# Patient Record
Sex: FEMALE | Race: AMERICAN INDIAN OR ALASKA NATIVE
[De-identification: names, ages, dates, MRNs, and addresses within clinical notes are randomized per-mention and may not be internally consistent; named-entity substitution may affect disease eponyms.]

---

## 2017-03-02 NOTE — MAMMOGRAPHY REPORT
BILATERAL DIGITAL SCREENING MAMMOGRAM WITH CAD:03/02/17 00:00:00



CLINICAL: Baseline screening.



FINDINGS: The breasts are heterogeneously dense, which may obscure 

small masses.No mass, architectural distortion or suspicious 

calcifications.



IMPRESSION: No mammographic evidence of malignancy.



BI-RADS CATEGORY:  1 -- Negative



RECOMMENDATION: Routine mammographic screening based on ACS guidelines.





ACR BI-RADS MAMMOGRAPHIC CODES:

0 = Needs additional imaging evaluation; 1 = Negative; 2 = Benign; 3 = 

Probably benign; 4 = Suspicious; 5 = Malignant; 6 = Known biopsy-proven 

malignancy



COMMENT:

      1.   Dense breast tissue, i.e., adenosis, fibrocystic 

            changes, etc., may obscure an underlying neoplasm.

      2.   Approximately 10% of cancers are not detected with

            mammography.

      3.   A negative mammography report should not delay biopsy 

            if a clinically suspicious mass is present.

## 2017-12-31 ENCOUNTER — HOSPITAL ENCOUNTER (INPATIENT)
Dept: HOSPITAL 5 - TRG | Age: 37
LOS: 2 days | Discharge: HOME | End: 2018-01-02
Attending: OBSTETRICS & GYNECOLOGY | Admitting: OBSTETRICS & GYNECOLOGY
Payer: COMMERCIAL

## 2017-12-31 DIAGNOSIS — Z3A.39: ICD-10-CM

## 2017-12-31 LAB
HCT VFR BLD CALC: 24.1 % (ref 30.3–42.9)
HCT VFR BLD CALC: 30.4 % (ref 30.3–42.9)
HGB BLD-MCNC: 10.3 GM/DL (ref 10.1–14.3)
HGB BLD-MCNC: 8.2 GM/DL (ref 10.1–14.3)

## 2017-12-31 PROCEDURE — 85018 HEMOGLOBIN: CPT

## 2017-12-31 PROCEDURE — G0463 HOSPITAL OUTPT CLINIC VISIT: HCPCS

## 2017-12-31 PROCEDURE — 36415 COLL VENOUS BLD VENIPUNCTURE: CPT

## 2017-12-31 PROCEDURE — 85014 HEMATOCRIT: CPT

## 2017-12-31 PROCEDURE — 99211 OFF/OP EST MAY X REQ PHY/QHP: CPT

## 2017-12-31 RX ADMIN — IBUPROFEN SCH MG: 600 TABLET, FILM COATED ORAL at 09:16

## 2017-12-31 RX ADMIN — IBUPROFEN SCH MG: 600 TABLET, FILM COATED ORAL at 22:07

## 2017-12-31 RX ADMIN — IBUPROFEN SCH MG: 600 TABLET, FILM COATED ORAL at 15:13

## 2017-12-31 NOTE — PROCEDURE NOTE
OB Delivery Note





- Delivery


Date of Delivery: 17


Surgeon: TIFFANIE LEMOS


Estimated blood loss: 100cc





- Vaginal


Delivery presentation: vertex


Delivery position: OA


Intrapartum events: precipitous labor- <3hr


Delivery monitor: external FHT


Route of delivery: 


Delivery placenta: spontaneous


Delivery cord: 3 umbilical vessels


Episiotomy: none


Delivery laceration: none


Anesthesia: none


Delivery comments: 


Patient progressed to C/C/+2 and pushed to deliver a liveborn male with apgars 

of 8/9 and weight of 8lbs 6oz. After delivery of the head, the shoulders 

delivered without difficulty. The infant was bulb suctioned. The cord was 

clamped and cut. The infant was placed on the patient's abdomen. The placenta 

delivered spontaneously intact with a 3VC. No lacerations were noted. EBL 100ml








- Infant


  ** A


Apgar at 1 minute: 8


Apgar at 5 minutes: 9


Infant Gender: Male (weight 8lbs 6oz)

## 2017-12-31 NOTE — HISTORY AND PHYSICAL REPORT
History of Present Illness


Date of examination: 17


Date of admission: 


17 06:55





Chief complaint: 


contractions





History of present illness: 


36y/o  @ 39+1 weeks presents with regular uterine contractions and 

advanced cervical dilation of 9cm. Patient transferred her care at 26 weeks. 

She had spontaneous rupture of membranes at presentation. Prenatal course 

complicated low lying placenta and AMA. Patient is +GBS. 








Past History


Past Medical History: no pertinent history


Past Surgical History: no surgical history


Social history: 





- Obstetrical History


Expected Date of Delivery: 18


Actual Gestation: 39 Week(s) 1 Day(s) 


: 5


Para: 3


Hx # Term Pregnancies: 3


Number of  Pregnancies: 0


Spontaneous Abortions: 1


Induced : 0


Number of Living Children: 3





Medications and Allergies


 Allergies











Allergy/AdvReac Type Severity Reaction Status Date / Time


 


No Known Allergies Allergy   Unverified 17 06:59











 Home Medications











 Medication  Instructions  Recorded  Confirmed  Last Taken  Type


 


Pnv,Calcium 72/Iron/Folic Acid 1 tab PO DAILY 17 1 Day Ago 

History





[Pnv Prenatal Plus Multivit Tab]    ~17 














Review of Systems


Genitourinary: leakage of fluid, contractions





- Vital Signs


Vital signs: 


 Vital Signs











Pulse Pulse Ox


 


 91 H  77 L


 


 17 08:05  17 08:05








 











Temp Pulse Resp BP Pulse Ox


 


    140 H     112/58   28 L


 


    17 08:32     17 08:21  17 08:32














- Physical Exam


Breasts: Positive: deferred


Cardiovascular: Regular rate


Lungs: Positive: Clear to auscultation


Abdomen: Positive: normal appearance





Results


All other labs normal.








Assessment and Plan





- Patient Problems


(1) Precipitous delivery


Current Visit: Yes   Status: Acute   


Plan to address problem: 


admit to L&D


patient was unable to receive antibiotic coverage

## 2018-01-01 RX ADMIN — IBUPROFEN SCH MG: 600 TABLET, FILM COATED ORAL at 12:36

## 2018-01-01 RX ADMIN — IBUPROFEN SCH: 600 TABLET, FILM COATED ORAL at 03:45

## 2018-01-01 RX ADMIN — IBUPROFEN SCH: 600 TABLET, FILM COATED ORAL at 19:00

## 2018-01-01 RX ADMIN — IBUPROFEN SCH MG: 600 TABLET, FILM COATED ORAL at 18:04

## 2018-01-01 NOTE — DISCHARGE SUMMARY
Providers





- Providers


Date of Admission: 


17 06:55





Date of discharge: 18


Attending physician: 


SERAFIN LERMA





Primary care physician: 


SERAFIN LERMA








Hospitalization


Reason for admission: active labor


Delivery: 


Discharge diagnosis: IUP at term delivered


[place] baby: male


Hospital course: 


The patient was admitted to labor and delivery in active labor with advanced 

cervical dilatation of 9 cm.  The patient was GBS positive however she was 

unable to receive adequate antibiotic coverage therefore the patient and the 

infant were observed for 48 hours.  Postpartum course was uncomplicated.





Condition at discharge: Good


Disposition: DC-01 TO HOME OR SELFCARE





- Discharge Diagnoses


(1) Precipitous delivery


Status: Acute   





Plan





- Discharge Medications


Prescriptions: 


RX: Ferrous Sulfate [Feosol 325 MG tab] 325 mg PO BID #60 tablet


HYDROcodone/APAP 5-325 [[place] 5/325] 1 each PO Q6HR PRN #30 tablet


 PRN Reason: Pain


Ibuprofen [Motrin] 800 mg PO Q8HR PRN #60 tablet


 PRN Reason: Pain





- Provider Discharge Summary


Activity: no sex for 6 weeks, no heavy lifting 4 weeks, no strenuous exercise


Diet: routine


Instructions: routine


Additional instructions: 


[]  Smoking cessation referral if applicable(refer to patient education folder 

for contact #)


[]  Refer to [facility] Booklet








Call your doctor immediately for:


* Fever > 100.5


* Heavy vaginal bleeding ( >1 pad per hour)


* Severe persistent headache


* Shortness of breath


* Reddened, hot, painful area to leg or breast


* Follow-up 4 weeks postpartum





- Follow up plan

## 2018-01-01 NOTE — PROGRESS NOTE
Assessment and Plan





- Patient Problems


(1) Precipitous delivery


Current Visit: Yes   Status: Acute   


Plan to address problem: 


Patient doing well


Discharge home tomorrow








Subjective





- Subjective


Date of service: 18


Interval history: 


The patient is without any significant complaints.  She is not experiencing any 

dizziness or chest pain.  She reports the pain is well-controlled.


Patient reports: appetite normal, voiding normally, pain well controlled, no 

dizzy ambulation


[place]: doing well





Objective





- Vital Signs


Latest vital signs: 


 Vital Signs











  Temp Pulse Resp BP Pulse Ox


 


 18 08:21  98.5 F  81  22  96/56  100


 


 18 06:38  98.4 F   18  85/48 


 


 18 02:18  98.9 F  85  20  101/56  97


 


 17 21:19  99.0 F  82  20  98/59  100


 


 17 20:09   78   116/61 


 


 17 20:05  98.5 F   18  








 Intake and Output











 17





 22:59 06:59 14:59


 


Intake Total 240  120


 


Output Total  500 


 


Balance 240 -500 120


 


Intake:   


 


  Oral   120


 


  Intake, Free Water 240  


 


Output:   


 


  Urine  500 


 


    Void  500 


 


Other:   


 


  Total, Intake Amount   120


 


  Total, Output Amount  500 


 


  # Voids   


 


    Void 1  1














- Exam


Abdomen: Present: normal appearance


Uterus: Present: normal, firm





- Labs


Labs: 


 Abnormal lab results











  17 Range/Units





  20:35 


 


Hgb  8.2 L  (10.1-14.3)  gm/dl


 


Hct  24.1 L D  (30.3-42.9)  %

## 2018-01-02 VITALS — DIASTOLIC BLOOD PRESSURE: 72 MMHG | SYSTOLIC BLOOD PRESSURE: 112 MMHG

## 2018-01-02 RX ADMIN — IBUPROFEN SCH MG: 600 TABLET, FILM COATED ORAL at 04:17

## 2018-02-28 ENCOUNTER — HOSPITAL ENCOUNTER (OUTPATIENT)
Dept: HOSPITAL 5 - OR | Age: 38
Discharge: HOME | End: 2018-02-28
Attending: OBSTETRICS & GYNECOLOGY
Payer: COMMERCIAL

## 2018-02-28 VITALS — SYSTOLIC BLOOD PRESSURE: 128 MMHG | DIASTOLIC BLOOD PRESSURE: 66 MMHG

## 2018-02-28 DIAGNOSIS — Z30.2: Primary | ICD-10-CM

## 2018-02-28 DIAGNOSIS — Z98.890: ICD-10-CM

## 2018-02-28 DIAGNOSIS — N83.201: ICD-10-CM

## 2018-02-28 DIAGNOSIS — D64.9: ICD-10-CM

## 2018-02-28 LAB
HCT VFR BLD CALC: 31.2 % (ref 30.3–42.9)
HGB BLD-MCNC: 10.4 GM/DL (ref 10.1–14.3)

## 2018-02-28 PROCEDURE — 85018 HEMOGLOBIN: CPT

## 2018-02-28 PROCEDURE — 58661 LAPAROSCOPY REMOVE ADNEXA: CPT

## 2018-02-28 PROCEDURE — 85014 HEMATOCRIT: CPT

## 2018-02-28 PROCEDURE — 36415 COLL VENOUS BLD VENIPUNCTURE: CPT

## 2018-02-28 PROCEDURE — 81025 URINE PREGNANCY TEST: CPT

## 2018-02-28 PROCEDURE — 88302 TISSUE EXAM BY PATHOLOGIST: CPT

## 2018-02-28 NOTE — OPERATIVE REPORT
Operative Report


Operative Report: 





Preoperative diagnosis: Undesired fertility


Postoperative diagnosis: Same


Procedure: Bilateral laparoscopic salpingectomy


Surgeon: Denise Lozano


Anesthesia: General


EBL: Minimal 


IV fluids: 1100 mL


Urine output: 300 mL


Findings:[Normal uterus tubes and ovaries with small right ovarian cyst]


Specimens: Portion of right and left fallopian tube


Complications: None





The patient was properly identified as herself.  She was then taken to the OR 

with IV running and in place.  She was given general anesthesia without 

difficulty.  She was placed in a dorsal lithotomy position.  She was then 

prepped and draped in normal sterile fashion.  Attention was turned to the 

patient's vagina.  Her bladder was drained of clear urine with a red rubber 

catheter.  The speculum was then placed the patient's vagina.  The cervix was 

visualized and grasped with tenaculum.  The acorn cannula was then inserted.  

The surgeon's gloves were changed and attention turned to the patient's 

abdomen.  A small incision was made in the patient's umbilicus incision a 5 mm 

trocar was placed.  The laparoscope confirmed intra-abdominal placement.  The 

abdomen was insufflated with CO2 gas to approximately 25 mmHg.  Both fallopian 

tubes were identified. With direct visualization a second trocar was placed 

through an incision in the left lower quadrant.  Both tubes were found and 

followed out to the fimbriated ends.  Each tube was cauterized at the portion 

nearest the cornua, then cauterized across the broad ligament until the tube 

was completely detached.  Attention was then turned to the patient's right 

ovary.  Using the scissors the cyst was punctured and clear fluid was allowed 

to drain from it.


There was excellent hemostasis at the end of this portion of the procedure.  

Each tube was handed off for pathology.  At this point the abdomen was 

deflated.  All instruments were then removed from the abdomen. The incisions 

were then closed with 4-0 Monocryl.  The incisions were also injected with 

quarter percent Marcaine.  The patient tolerated the procedure well she was 

then awakened and taken recovery in stable condition.  Sponge needle and 

instrument counts were correct 2.

## 2018-02-28 NOTE — ANESTHESIA CONSULTATION
Anesthesia Consult and Med Hx





- Airway


Anesthetic Teeth Evaluation: Good


ROM Head & Neck: Adequate


Mental/Hyoid Distance: Adequate


Mallampati Class: Class II


Intubation Access Assessment: Good





- Pulmonary Exam


CTA: Yes





- Cardiac Exam


Cardiac Exam: RRR





- Pre-Operative Health Status


ASA Pre-Surgery Classification: ASA1


Proposed Anesthetic Plan: General





- Pulmonary


Hx Asthma: No


COPD: No


Hx Pneumonia: No





- Cardiovascular System


Hx Hypertension: No





- Central Nervous System


Hx Seizures: No


Hx Psychiatric Problems: No





- Endocrine


Hx Renal Disease: No


Hx End Stage Renal Disease: No


Hx Hypothyroidism: No


Hx Hyperthyroidism: No





- Hematic


Hx Anemia: Yes


Hx Sickle Cell Disease: No





- Other Systems


Hx Alcohol Use: Yes (occas)


Hx Cancer: No

## 2018-02-28 NOTE — SHORT STAY SUMMARY
Short Stay Documentation


Date of service: 18


Narrative H&P: 


Patient is a 38 year old  who presents for tubal ligation 6 weeks post 

delivery of a male infant.





- History


Principal diagnosis: Undesired fertility


H&P: obtained from office


Past Medical History: No medical history


Past Surgical History: No surgical history


Social history: single





- Allergies and Medications


Current Medications: 


 Allergies





No Known Allergies Allergy (Unverified 18 11:14)


 





 Home Medications











 Medication  Instructions  Recorded  Confirmed  Last Taken  Type


 


Norethindrone [Norlyroc] 0.35 mg PO DAILY 18 Unknown History








Active Medications





Hydromorphone HCl (Dilaudid)  0.5 mg IV Q10MIN PRN


   PRN Reason: Pain , Severe (7-10)


Lactated Ringer's (Lactated Ringers)  1,000 mls @ 42 mls/hr IV AS DIRECT DINA


Cefazolin Sodium (Ancef/Sterile Water 2 Gm/20 Ml)  2 gm in 20 mls @ 80 mls/hr 

IV PREOP NR; Protocol


Meperidine HCl (Demerol)  25 mg IV ONCE PRN


   PRN Reason: Shivering


Ondansetron HCl (Zofran)  4 mg IV ONCE PRN


   PRN Reason: Nausea And Vomiting


Oxycodone/Acetaminophen (Percocet 5/325)  1 tab PO ONCE PRN


   PRN Reason: Pain, Moderate (4-6)











- Physical exam


General appearance: no acute distress


Integumentary: no rash, no growths


Lungs: Clear to auscultation, Normal air movement


Breasts: deferred


Heart: Regular rate, Normal S1, Normal S2


Gastrointestinal: normal, normoactive bowel sounds


Female Genitourinary: deferred


Rectal Exam: deferred


Extremities: No edema





- Brief post op/procedure progress note


Date of procedure: 18


Pre-op diagnosis: Undesired fertility


Post-op diagnosis: same


Procedure: 





Bilateral partial salpingectomy


Anesthesia: GETA


Findings: 





Normal uterus and tubes, small cyst on right ovary


Surgeon: SERAFIN LERMA


Estimated blood loss: minimal


Pathology: list (right and left fallopian tubes)


Specimen disposition: to lab


Condition: stable





- Hospital course


Hospital course: 





Unremarkable





- Disposition


Condition at discharge: Good


Disposition: DC- TO HOME OR SELFCARE





Short Stay Discharge Plan


Activity: advance as tolerated


Weight Bearing Status: Weight Bear as Tolerated


Diet: regular


Follow up with: 


SERAFIN LERMA MD [Staff Physician] - 14 Days


Prescriptions: 


HYDROcodone/APAP 5-325 [[place] 5/325] 2 each PO Q6HR PRN #40 tablet


 PRN Reason: Pain


Ibuprofen [Motrin] 800 mg PO Q6H PRN #40 tablet


 PRN Reason: Pain

## 2018-02-28 NOTE — POST ANESTHESIA EVALUATION
- Post Anesthesia Evaluation


Airway Patent: Yes


Stable Respiratory Function: Yes


Nausea/Vomiting: No


Temp > 96.8F: Yes


Pain Manageable: Yes


Adequeate Hydration: Yes


Anesthesia Complications: No


Block Receding Appropriately: Not Applicable


Patient on Ventilator: No